# Patient Record
Sex: FEMALE
[De-identification: names, ages, dates, MRNs, and addresses within clinical notes are randomized per-mention and may not be internally consistent; named-entity substitution may affect disease eponyms.]

---

## 2020-07-22 ENCOUNTER — HOSPITAL ENCOUNTER (OUTPATIENT)
Dept: HOSPITAL 88 - MRI | Age: 30
End: 2020-07-22
Attending: SPECIALIST
Payer: COMMERCIAL

## 2020-07-22 DIAGNOSIS — M23.91: Primary | ICD-10-CM

## 2020-07-22 NOTE — DIAGNOSTIC IMAGING REPORT
TECHNIQUE:

Magnetic resonance imaging of the RIGHT KNEE was performed WITHOUT injected

contrast. 



HISTORY:  Right knee pain

COMPARISON:  None available.



FINDINGS: 

LIGAMENTS AND TENDONS:

     ACL:  Complete anterior cruciate ligament tear with pivot shift contusion.

     PCL:  Intact

     Collateral ligaments:  Partial medial collateral ligament tear.

     Iliotibial band:  Unremarkable

     Popliteal tendon:  Intact

     Extensor mechanism:  Intact



JOINT:

     Menisci: 

          Medial:  Intact

          Lateral:  Intact



     Articular Cartilage:

          Medial Compartment:  No focal defect.

          Lateral Compartment:  No focal defect.

          Patellofemoral Compartment:  No focal defect.



     Joint Fluid: The amount of fluid within the joint is within physiologic

limits.



BONE:  

No focal or infiltrative bone marrow replacing abnormality.  

No acute fracture.



SOFT TISSUES:

Otherwise, unremarkable.





IMPRESSION: 



Anterior cruciate ligament complete tear



Medial collateral ligament partial tear



Signed by: Dr. Andrew Palisch, M.D. on 7/22/2020 12:03 PM

## 2020-08-31 ENCOUNTER — HOSPITAL ENCOUNTER (OUTPATIENT)
Dept: HOSPITAL 88 - PT | Age: 30
End: 2020-08-31
Attending: SPECIALIST
Payer: COMMERCIAL

## 2020-08-31 DIAGNOSIS — S83.411D: Primary | ICD-10-CM

## 2020-08-31 DIAGNOSIS — S83.511D: ICD-10-CM

## 2020-09-25 ENCOUNTER — HOSPITAL ENCOUNTER (OUTPATIENT)
Dept: HOSPITAL 88 - PT | Age: 30
LOS: 5 days | End: 2020-09-30
Attending: SPECIALIST
Payer: COMMERCIAL

## 2020-09-25 DIAGNOSIS — S83.411D: Primary | ICD-10-CM

## 2020-09-25 DIAGNOSIS — S83.511D: ICD-10-CM

## 2020-10-15 ENCOUNTER — HOSPITAL ENCOUNTER (OUTPATIENT)
Dept: HOSPITAL 88 - PT | Age: 30
LOS: 16 days | End: 2020-10-31
Attending: SPECIALIST
Payer: COMMERCIAL

## 2020-10-15 DIAGNOSIS — M62.81: ICD-10-CM

## 2020-10-15 DIAGNOSIS — R26.2: ICD-10-CM

## 2020-10-15 DIAGNOSIS — M25.561: ICD-10-CM

## 2020-10-15 DIAGNOSIS — S83.411D: Primary | ICD-10-CM

## 2020-10-15 DIAGNOSIS — M25.661: ICD-10-CM

## 2020-10-15 DIAGNOSIS — S83.511D: ICD-10-CM
